# Patient Record
Sex: FEMALE | URBAN - NONMETROPOLITAN AREA
[De-identification: names, ages, dates, MRNs, and addresses within clinical notes are randomized per-mention and may not be internally consistent; named-entity substitution may affect disease eponyms.]

---

## 2021-12-15 ENCOUNTER — APPOINTMENT (OUTPATIENT)
Dept: URBAN - NONMETROPOLITAN AREA CLINIC 51 | Age: 66
Setting detail: DERMATOLOGY
End: 2021-12-16

## 2021-12-15 VITALS — HEIGHT: 61 IN | WEIGHT: 121 LBS

## 2021-12-15 DIAGNOSIS — Z85.828 PERSONAL HISTORY OF OTHER MALIGNANT NEOPLASM OF SKIN: ICD-10-CM

## 2021-12-15 DIAGNOSIS — L21.8 OTHER SEBORRHEIC DERMATITIS: ICD-10-CM

## 2021-12-15 PROCEDURE — 99203 OFFICE O/P NEW LOW 30 MIN: CPT

## 2021-12-15 PROCEDURE — OTHER RECOMMENDATIONS: OTHER

## 2021-12-15 PROCEDURE — OTHER REASSURANCE: OTHER

## 2021-12-15 PROCEDURE — OTHER COUNSELING: OTHER

## 2021-12-15 ASSESSMENT — LOCATION DETAILED DESCRIPTION DERM
LOCATION DETAILED: LEFT SUPERIOR PARIETAL SCALP
LOCATION DETAILED: POSTERIOR MID-PARIETAL SCALP

## 2021-12-15 ASSESSMENT — LOCATION SIMPLE DESCRIPTION DERM
LOCATION SIMPLE: SCALP
LOCATION SIMPLE: POSTERIOR SCALP

## 2021-12-15 ASSESSMENT — LOCATION ZONE DERM: LOCATION ZONE: SCALP

## 2021-12-15 NOTE — PROCEDURE: RECOMMENDATIONS
Render Risk Assessment In Note?: no
Recommendation Preamble: The following recommendations were made during the visit:
Recommendations (Free Text): T sal first on last off in the shower
Detail Level: Zone